# Patient Record
Sex: MALE | Race: WHITE | NOT HISPANIC OR LATINO
[De-identification: names, ages, dates, MRNs, and addresses within clinical notes are randomized per-mention and may not be internally consistent; named-entity substitution may affect disease eponyms.]

---

## 2017-12-12 ENCOUNTER — HOSPITAL (OUTPATIENT)
Dept: OTHER | Age: 27
End: 2017-12-12
Attending: INTERNAL MEDICINE

## 2023-01-07 ENCOUNTER — OFFICE VISIT (OUTPATIENT)
Dept: FAMILY MEDICINE CLINIC | Facility: CLINIC | Age: 33
End: 2023-01-07
Payer: COMMERCIAL

## 2023-01-07 VITALS
TEMPERATURE: 98 F | WEIGHT: 169.63 LBS | OXYGEN SATURATION: 98 % | HEART RATE: 81 BPM | DIASTOLIC BLOOD PRESSURE: 82 MMHG | HEIGHT: 75 IN | SYSTOLIC BLOOD PRESSURE: 136 MMHG | BODY MASS INDEX: 21.09 KG/M2 | RESPIRATION RATE: 20 BRPM

## 2023-01-07 DIAGNOSIS — J02.9 PHARYNGITIS, UNSPECIFIED ETIOLOGY: Primary | ICD-10-CM

## 2023-01-07 LAB
CONTROL LINE PRESENT WITH A CLEAR BACKGROUND (YES/NO): YES YES/NO
KIT LOT #: NORMAL NUMERIC
STREP GRP A CUL-SCR: NEGATIVE

## 2023-01-07 PROCEDURE — 3075F SYST BP GE 130 - 139MM HG: CPT | Performed by: NURSE PRACTITIONER

## 2023-01-07 PROCEDURE — 99202 OFFICE O/P NEW SF 15 MIN: CPT | Performed by: NURSE PRACTITIONER

## 2023-01-07 PROCEDURE — 3079F DIAST BP 80-89 MM HG: CPT | Performed by: NURSE PRACTITIONER

## 2023-01-07 PROCEDURE — 87880 STREP A ASSAY W/OPTIC: CPT | Performed by: NURSE PRACTITIONER

## 2023-01-07 PROCEDURE — 3008F BODY MASS INDEX DOCD: CPT | Performed by: NURSE PRACTITIONER

## 2024-06-12 ENCOUNTER — ANESTHESIA EVENT (OUTPATIENT)
Dept: SURGERY | Facility: HOSPITAL | Age: 34
End: 2024-06-12
Payer: COMMERCIAL

## 2024-06-12 ENCOUNTER — HOSPITAL ENCOUNTER (OUTPATIENT)
Facility: HOSPITAL | Age: 34
Setting detail: HOSPITAL OUTPATIENT SURGERY
Discharge: HOME | End: 2024-06-12
Attending: ORTHOPAEDIC SURGERY | Admitting: ORTHOPAEDIC SURGERY
Payer: COMMERCIAL

## 2024-06-12 ENCOUNTER — ANESTHESIA (OUTPATIENT)
Dept: SURGERY | Facility: HOSPITAL | Age: 34
End: 2024-06-12
Payer: COMMERCIAL

## 2024-06-12 VITALS
RESPIRATION RATE: 18 BRPM | HEIGHT: 75 IN | TEMPERATURE: 98 F | OXYGEN SATURATION: 98 % | HEART RATE: 79 BPM | SYSTOLIC BLOOD PRESSURE: 143 MMHG | DIASTOLIC BLOOD PRESSURE: 73 MMHG | WEIGHT: 164.69 LBS | BODY MASS INDEX: 20.48 KG/M2

## 2024-06-12 DIAGNOSIS — M93.262 OSTEOCHONDRITIS DISSECANS OF KNEE, LEFT: Primary | ICD-10-CM

## 2024-06-12 PROCEDURE — 76942 ECHO GUIDE FOR BIOPSY: CPT | Performed by: ANESTHESIOLOGY

## 2024-06-12 PROCEDURE — 0SUD0KZ SUPPLEMENT LEFT KNEE JOINT WITH NONAUTOLOGOUS TISSUE SUBSTITUTE, OPEN APPROACH: ICD-10-PCS | Performed by: ORTHOPAEDIC SURGERY

## 2024-06-12 PROCEDURE — 0SCD4ZZ EXTIRPATION OF MATTER FROM LEFT KNEE JOINT, PERCUTANEOUS ENDOSCOPIC APPROACH: ICD-10-PCS | Performed by: ORTHOPAEDIC SURGERY

## 2024-06-12 PROCEDURE — 0SBD4ZZ EXCISION OF LEFT KNEE JOINT, PERCUTANEOUS ENDOSCOPIC APPROACH: ICD-10-PCS | Performed by: ORTHOPAEDIC SURGERY

## 2024-06-12 DEVICE — IMPLANTABLE DEVICE: Type: IMPLANTABLE DEVICE | Site: KNEE | Status: FUNCTIONAL

## 2024-06-12 RX ORDER — MEPERIDINE HYDROCHLORIDE 25 MG/ML
INJECTION INTRAMUSCULAR; INTRAVENOUS; SUBCUTANEOUS
Status: COMPLETED
Start: 2024-06-12 | End: 2024-06-12

## 2024-06-12 RX ORDER — HYDROCODONE BITARTRATE AND ACETAMINOPHEN 5; 325 MG/1; MG/1
1 TABLET ORAL EVERY 6 HOURS PRN
Qty: 12 TABLET | Refills: 0 | Status: SHIPPED | OUTPATIENT
Start: 2024-06-12

## 2024-06-12 RX ORDER — NAPROXEN 500 MG/1
500 TABLET ORAL 2 TIMES DAILY
Qty: 60 TABLET | Refills: 1 | Status: SHIPPED | OUTPATIENT
Start: 2024-06-12

## 2024-06-12 RX ORDER — ACETAMINOPHEN 500 MG
1000 TABLET ORAL ONCE AS NEEDED
Status: COMPLETED | OUTPATIENT
Start: 2024-06-12 | End: 2024-06-12

## 2024-06-12 RX ORDER — ROCURONIUM BROMIDE 10 MG/ML
INJECTION, SOLUTION INTRAVENOUS AS NEEDED
Status: DISCONTINUED | OUTPATIENT
Start: 2024-06-12 | End: 2024-06-12 | Stop reason: SURG

## 2024-06-12 RX ORDER — CEFAZOLIN SODIUM 1 G/3ML
INJECTION, POWDER, FOR SOLUTION INTRAMUSCULAR; INTRAVENOUS AS NEEDED
Status: DISCONTINUED | OUTPATIENT
Start: 2024-06-12 | End: 2024-06-12 | Stop reason: SURG

## 2024-06-12 RX ORDER — HYDROMORPHONE HYDROCHLORIDE 1 MG/ML
0.2 INJECTION, SOLUTION INTRAMUSCULAR; INTRAVENOUS; SUBCUTANEOUS EVERY 5 MIN PRN
Status: DISCONTINUED | OUTPATIENT
Start: 2024-06-12 | End: 2024-06-12

## 2024-06-12 RX ORDER — HYDROCODONE BITARTRATE AND ACETAMINOPHEN 5; 325 MG/1; MG/1
1 TABLET ORAL ONCE AS NEEDED
Status: COMPLETED | OUTPATIENT
Start: 2024-06-12 | End: 2024-06-12

## 2024-06-12 RX ORDER — ASPIRIN 325 MG
325 TABLET, DELAYED RELEASE (ENTERIC COATED) ORAL DAILY
Qty: 21 TABLET | Refills: 0 | Status: SHIPPED | OUTPATIENT
Start: 2024-06-12 | End: 2024-07-03

## 2024-06-12 RX ORDER — NEOSTIGMINE METHYLSULFATE 1 MG/ML
INJECTION, SOLUTION INTRAVENOUS AS NEEDED
Status: DISCONTINUED | OUTPATIENT
Start: 2024-06-12 | End: 2024-06-12 | Stop reason: SURG

## 2024-06-12 RX ORDER — GLYCOPYRROLATE 0.2 MG/ML
INJECTION, SOLUTION INTRAMUSCULAR; INTRAVENOUS AS NEEDED
Status: DISCONTINUED | OUTPATIENT
Start: 2024-06-12 | End: 2024-06-12 | Stop reason: SURG

## 2024-06-12 RX ORDER — ACETAMINOPHEN 500 MG
1000 TABLET ORAL ONCE
Status: DISCONTINUED | OUTPATIENT
Start: 2024-06-12 | End: 2024-06-12 | Stop reason: HOSPADM

## 2024-06-12 RX ORDER — HYDROMORPHONE HYDROCHLORIDE 1 MG/ML
INJECTION, SOLUTION INTRAMUSCULAR; INTRAVENOUS; SUBCUTANEOUS
Status: COMPLETED
Start: 2024-06-12 | End: 2024-06-12

## 2024-06-12 RX ORDER — TRANEXAMIC ACID 10 MG/ML
INJECTION, SOLUTION INTRAVENOUS AS NEEDED
Status: DISCONTINUED | OUTPATIENT
Start: 2024-06-12 | End: 2024-06-12 | Stop reason: SURG

## 2024-06-12 RX ORDER — ONDANSETRON 4 MG/1
4 TABLET, FILM COATED ORAL EVERY 8 HOURS PRN
Qty: 10 TABLET | Refills: 0 | Status: SHIPPED | OUTPATIENT
Start: 2024-06-12

## 2024-06-12 RX ORDER — IBUPROFEN 600 MG/1
600 TABLET ORAL ONCE AS NEEDED
Status: DISCONTINUED | OUTPATIENT
Start: 2024-06-12 | End: 2024-06-12

## 2024-06-12 RX ORDER — MEPERIDINE HYDROCHLORIDE 25 MG/ML
12.5 INJECTION INTRAMUSCULAR; INTRAVENOUS; SUBCUTANEOUS AS NEEDED
Status: DISCONTINUED | OUTPATIENT
Start: 2024-06-12 | End: 2024-06-12

## 2024-06-12 RX ORDER — ONDANSETRON 2 MG/ML
4 INJECTION INTRAMUSCULAR; INTRAVENOUS EVERY 6 HOURS PRN
Status: DISCONTINUED | OUTPATIENT
Start: 2024-06-12 | End: 2024-06-12

## 2024-06-12 RX ORDER — LIDOCAINE HYDROCHLORIDE 10 MG/ML
INJECTION, SOLUTION EPIDURAL; INFILTRATION; INTRACAUDAL; PERINEURAL AS NEEDED
Status: DISCONTINUED | OUTPATIENT
Start: 2024-06-12 | End: 2024-06-12 | Stop reason: SURG

## 2024-06-12 RX ORDER — SODIUM CHLORIDE, SODIUM LACTATE, POTASSIUM CHLORIDE, CALCIUM CHLORIDE 600; 310; 30; 20 MG/100ML; MG/100ML; MG/100ML; MG/100ML
INJECTION, SOLUTION INTRAVENOUS CONTINUOUS
Status: DISCONTINUED | OUTPATIENT
Start: 2024-06-12 | End: 2024-06-12

## 2024-06-12 RX ORDER — HYDROMORPHONE HYDROCHLORIDE 1 MG/ML
0.6 INJECTION, SOLUTION INTRAMUSCULAR; INTRAVENOUS; SUBCUTANEOUS EVERY 5 MIN PRN
Status: DISCONTINUED | OUTPATIENT
Start: 2024-06-12 | End: 2024-06-12

## 2024-06-12 RX ORDER — LABETALOL HYDROCHLORIDE 5 MG/ML
5 INJECTION, SOLUTION INTRAVENOUS EVERY 5 MIN PRN
Status: DISCONTINUED | OUTPATIENT
Start: 2024-06-12 | End: 2024-06-12

## 2024-06-12 RX ORDER — HYDROCODONE BITARTRATE AND ACETAMINOPHEN 5; 325 MG/1; MG/1
2 TABLET ORAL ONCE AS NEEDED
Status: COMPLETED | OUTPATIENT
Start: 2024-06-12 | End: 2024-06-12

## 2024-06-12 RX ORDER — NALOXONE HYDROCHLORIDE 0.4 MG/ML
0.08 INJECTION, SOLUTION INTRAMUSCULAR; INTRAVENOUS; SUBCUTANEOUS AS NEEDED
Status: DISCONTINUED | OUTPATIENT
Start: 2024-06-12 | End: 2024-06-12

## 2024-06-12 RX ORDER — MIDAZOLAM HYDROCHLORIDE 1 MG/ML
INJECTION INTRAMUSCULAR; INTRAVENOUS AS NEEDED
Status: DISCONTINUED | OUTPATIENT
Start: 2024-06-12 | End: 2024-06-12 | Stop reason: SURG

## 2024-06-12 RX ORDER — DEXAMETHASONE SODIUM PHOSPHATE 4 MG/ML
VIAL (ML) INJECTION AS NEEDED
Status: DISCONTINUED | OUTPATIENT
Start: 2024-06-12 | End: 2024-06-12 | Stop reason: SURG

## 2024-06-12 RX ORDER — SCOLOPAMINE TRANSDERMAL SYSTEM 1 MG/1
1 PATCH, EXTENDED RELEASE TRANSDERMAL ONCE
Status: DISCONTINUED | OUTPATIENT
Start: 2024-06-12 | End: 2024-06-12 | Stop reason: HOSPADM

## 2024-06-12 RX ORDER — MIDAZOLAM HYDROCHLORIDE 1 MG/ML
1 INJECTION INTRAMUSCULAR; INTRAVENOUS EVERY 5 MIN PRN
Status: DISCONTINUED | OUTPATIENT
Start: 2024-06-12 | End: 2024-06-12

## 2024-06-12 RX ORDER — ONDANSETRON 2 MG/ML
INJECTION INTRAMUSCULAR; INTRAVENOUS AS NEEDED
Status: DISCONTINUED | OUTPATIENT
Start: 2024-06-12 | End: 2024-06-12 | Stop reason: SURG

## 2024-06-12 RX ORDER — HYDROMORPHONE HYDROCHLORIDE 1 MG/ML
0.4 INJECTION, SOLUTION INTRAMUSCULAR; INTRAVENOUS; SUBCUTANEOUS EVERY 5 MIN PRN
Status: DISCONTINUED | OUTPATIENT
Start: 2024-06-12 | End: 2024-06-12

## 2024-06-12 RX ORDER — ROPIVACAINE HYDROCHLORIDE 5 MG/ML
INJECTION, SOLUTION EPIDURAL; INFILTRATION; PERINEURAL AS NEEDED
Status: DISCONTINUED | OUTPATIENT
Start: 2024-06-12 | End: 2024-06-12 | Stop reason: SURG

## 2024-06-12 RX ORDER — PROCHLORPERAZINE EDISYLATE 5 MG/ML
5 INJECTION INTRAMUSCULAR; INTRAVENOUS EVERY 8 HOURS PRN
Status: DISCONTINUED | OUTPATIENT
Start: 2024-06-12 | End: 2024-06-12

## 2024-06-12 RX ORDER — DEXAMETHASONE SODIUM PHOSPHATE 10 MG/ML
INJECTION, SOLUTION INTRAMUSCULAR; INTRAVENOUS AS NEEDED
Status: DISCONTINUED | OUTPATIENT
Start: 2024-06-12 | End: 2024-06-12 | Stop reason: SURG

## 2024-06-12 RX ADMIN — SODIUM CHLORIDE, SODIUM LACTATE, POTASSIUM CHLORIDE, CALCIUM CHLORIDE: 600; 310; 30; 20 INJECTION, SOLUTION INTRAVENOUS at 14:52:00

## 2024-06-12 RX ADMIN — MIDAZOLAM HYDROCHLORIDE 2 MG: 1 INJECTION INTRAMUSCULAR; INTRAVENOUS at 14:44:00

## 2024-06-12 RX ADMIN — GLYCOPYRROLATE 0.8 MG: 0.2 INJECTION, SOLUTION INTRAMUSCULAR; INTRAVENOUS at 16:59:00

## 2024-06-12 RX ADMIN — DEXAMETHASONE SODIUM PHOSPHATE 2 MG: 10 INJECTION, SOLUTION INTRAMUSCULAR; INTRAVENOUS at 14:52:00

## 2024-06-12 RX ADMIN — SODIUM CHLORIDE, SODIUM LACTATE, POTASSIUM CHLORIDE, CALCIUM CHLORIDE: 600; 310; 30; 20 INJECTION, SOLUTION INTRAVENOUS at 17:38:00

## 2024-06-12 RX ADMIN — ROCURONIUM BROMIDE 50 MG: 10 INJECTION, SOLUTION INTRAVENOUS at 14:55:00

## 2024-06-12 RX ADMIN — LIDOCAINE HYDROCHLORIDE 100 MG: 10 INJECTION, SOLUTION EPIDURAL; INFILTRATION; INTRACAUDAL; PERINEURAL at 14:54:00

## 2024-06-12 RX ADMIN — ONDANSETRON 4 MG: 2 INJECTION INTRAMUSCULAR; INTRAVENOUS at 16:57:00

## 2024-06-12 RX ADMIN — DEXAMETHASONE SODIUM PHOSPHATE 4 MG: 4 MG/ML VIAL (ML) INJECTION at 14:59:00

## 2024-06-12 RX ADMIN — ROPIVACAINE HYDROCHLORIDE 20 ML: 5 INJECTION, SOLUTION EPIDURAL; INFILTRATION; PERINEURAL at 14:52:00

## 2024-06-12 RX ADMIN — NEOSTIGMINE METHYLSULFATE 5 MG: 1 INJECTION, SOLUTION INTRAVENOUS at 16:59:00

## 2024-06-12 RX ADMIN — TRANEXAMIC ACID 1000 MG: 10 INJECTION, SOLUTION INTRAVENOUS at 15:00:00

## 2024-06-12 RX ADMIN — CEFAZOLIN SODIUM 2 G: 1 INJECTION, POWDER, FOR SOLUTION INTRAMUSCULAR; INTRAVENOUS at 14:52:00

## 2024-06-12 NOTE — H&P
ORTHOPEDIC SURGERY CLINIC PROGRESS NOTE      Patient Name:Butch Toro Jr.  Date of Appointment: 6/12/2024     Chief Complaint: Patient presents with:  Left Knee - Pain        HPI:    Butch Toro Jr. is a 33 year old male who presents for evaluation of left knee pain that has been present intermittently for  several  years but acutely worsened over the past 3 weeks with further worsening yesterday. Patient states that the pain began as the result of a direct blow the the anterior knee related mechanism. Since onset, symptoms have worsened. He notes pain with weightbearing, associated intermittent swelling, and attempted knee ROM . He endorses mechanical symptoms including clicking, locking, and catching. He endorses acute onset large effusion following injury yesterday. He denies feelings or rotational or coronal plane instability and feelings of giving way. He has attempted treatment with oral OTC antiinflammatory medication and tylenol. He has had no prior advanced imaging of the affected knee.        Past Medical History  History reviewed. No pertinent past medical history.     Past Surgical History        Past Surgical History:   Procedure Laterality Date    OTHER SURGICAL HISTORY   8/3/16     Scrotal cyst removal  Dr. Marshall     OTHER SURGICAL HISTORY   12/14/16     removal of scrotal cyst Dr Marshall         Medications  No current outpatient medications on file.     Allergies     Sulfa Antibiotics       OTHER (SEE COMMENTS)    Comment:Unknown reaction     Social History  Social History    Tobacco Use      Smoking status: Never      Smokeless tobacco: Never    Vaping Use      Vaping Use: Never used    Alcohol use: Yes      Comment: 3 days per week, 2-5 drinks per day    Drug use: No        Family History:        Family History   Problem Relation Age of Onset    Cancer Paternal Grandfather           prostate         Review of Systems:   Constitutional:  Denies fever, chills or weight loss    Allergies: As described in allergies  HEENT:  Denies sore throat or acute eye problems   Respiratory:  Denies shortness of breath   Cardiovascular:  Denies chest pain or palpitations  GI:  Denies abdominal pain, nausea  Skin:  Denies rash   Neurologic:  Denies headache or other problems  Musculoskeletal:  As described in HPI  14 System Review of Systems otherwise negative      Physical Exam:      There were no vitals taken for this visit.      Constitutional: No acute distress. Well-nourished. Well-developed.  Head/Face: Normal appearance. Normocephalic. Atraumatic.  Respiratory: Normal Effort  Cardiovascular: warm, well-perfused distal extremities  Neurological: Oriented to time, place, and situation.  Psych: Normal mood, appropriate affect.     Musculoskeletal:        Gait: Normal. The patient can bear weight on the injured extremity.   Neurovascular exam: Sensation is intact to light touch in all dermatomes of the lower extremities and distal pulses are normal. Capillary refill is < 2 seconds.  Lumbar Spine: No tenderness to palpation and supine straight leg raise is negative.     KNEE:    5/10/2024    5/10/2024        RIGHT Knee LEFT Knee   Deformity No gross deformity No gross deformity   Skin Clean, dry, and intact. No erythema or ecchymosis Clean, dry, and intact. No erythema or ecchymosis   Atrophy None None   Tenderness None Medial femoral condyle in deep flexion   Effusion Negative Negative   Range of Motion    Extension: 0  Flexion: 140 Extension: 0  Flexion: 140   Strength Normal and equal bilaterally Normal and equal bilaterally   Stability Lachman: Stable  LCL: Stable  MCL: Stable  PCL: Stable Lachman: Stable  LCL: Stable  MCL: Stable  PCL: Stable   Special Tests None Juanpablo: positive medial   Hip motion No pain on internal and external rotation No pain on internal and external rotation   Sensation Intact except as noted    Intact except as noted   Perfusion Distal pulse present   Distal pulse  present   Lymphatics No lymphadenopathy and No edema    No lymphadenopathy and No edema            Diagnostic Studies:      4 views of the left knee including bilateral AP standing, 45 degree flexion, sunrise, and affected lateral demonstrate no acute fracture or dislocation.  There is well-preserved joint space in medial, lateral, patellofemoral compartments without evidence of degenerative change. There is appropriate patellar positioning on lateral imaging without evidence of nicolette or baja.  There is a radiolucent area present to the medial femoral condyle concerning for possible old osteochondritis dissecans with associated intra-articular loose body visualized on APs and lateral views present within the notch.    MRI demonstrates a 2.5 cm x 1.6 cm osteochondral defect along the weightbearing aspect of the medial femoral condyle. This demonstrates a large dislodged intra-articular osteochondral fragment that is present in 2 pieces both within the anterior notch. Remaining aspects of the knee joint appropriate appearance without evidence of injury.      Assessment:      33 year old male presents with the following diagnoses:     1. Acute pain of left knee  - XR KNEE, COMPLETE (4 OR MORE VIEWS), LEFT (CPT=73564); Future  - MRI KNEE, LEFT (QUT=21679); Future        Plan:      Plan:   - To OR for left knee arthroscopy, removal of loose body, open osteochondral allograft reconstruction of medial femoral condyle lesion       Martín Batista MD  [facility]  Orthopedic Surgery, Sports Medicine

## 2024-06-12 NOTE — ANESTHESIA POSTPROCEDURE EVALUATION
[facility]    Butch Toro  Patient Status:  Hospital Outpatient Surgery   Age/Gender 33 year old male MRN [id number]   Location [facility] SURGERY Attending Martín Batista MD   Hosp Day # 0 PCP No primary care provider on file.       Anesthesia Post-op Note    LEFT KNEE ARTHROSCOPY, CHONDROPLASTY, REMOVAL OF LOOSE BODY, OPEN OSTEOCHONDRAL ALLOGRAFT RECONSTRUCTION    Procedure Summary       Date: 06/12/24 Room / Location:  MAIN OR [address] [place] [address] MAIN OR    Anesthesia Start: 1442 Anesthesia Stop: 1738    Procedure: LEFT KNEE ARTHROSCOPY, CHONDROPLASTY, REMOVAL OF LOOSE BODY, OPEN OSTEOCHONDRAL ALLOGRAFT RECONSTRUCTION (Left: Knee) Diagnosis: (OSTEOCHONDRITIS DISSECANS, CHONDRAL LOOSE BODY)    Surgeons: Martín Batista MD Anesthesiologist: Darvin Arvizu MD    Anesthesia Type: general, regional ASA Status: 1            Anesthesia Type: general, regional    Vitals Value Taken Time   /70 06/12/24 1739   Temp 98.2 06/12/24 1740   Pulse 102 06/12/24 1740   Resp 18 06/12/24 1740   SpO2 100 % 06/12/24 1740   Vitals shown include unfiled device data.    Patient Location: PACU    Anesthesia Type: general    Airway Patency: patent and extubated    Postop Pain Control: adequate    Mental Status: mildly sedated but able to meaningfully participate in the post-anesthesia evaluation    Nausea/Vomiting: none    Cardiopulmonary/Hydration status: stable euvolemic    Complications: no apparent anesthesia related complications    Postop vital signs: stable    Dental Exam: Unchanged from Preop    Patient to be discharged from PACU when criteria met.

## 2024-06-12 NOTE — ANESTHESIA PROCEDURE NOTES
Airway  Date/Time: 6/12/2024 2:57 PM  Urgency: elective    Airway not difficult    General Information and Staff    Patient location during procedure: OR  Anesthesiologist: Rod Dasilva MD  Resident/CRNA: Keiko Lindsey CRNA  Performed: CRNA   Performed by: Keiko Lindsey CRNA  Authorized by: Rod Dasilva MD      Indications and Patient Condition  Indications for airway management: anesthesia  Sedation level: deep  Preoxygenated: yes  Patient position: sniffing  Mask difficulty assessment: 1 - vent by mask    Final Airway Details  Final airway type: endotracheal airway      Successful airway: ETT  Cuffed: yes   Successful intubation technique: direct laryngoscopy  Endotracheal tube insertion site: oral  Blade: Azeb  Blade size: #4  ETT size (mm): 8.0    Cormack-Lehane Classification: grade I - full view of glottis  Placement verified by: capnometry   Measured from: lips  ETT to lips (cm): 23  Number of attempts at approach: 1    Additional Comments  Atraumatic intubation. Dentition and OP as per preop.

## 2024-06-12 NOTE — BRIEF OP NOTE
Pre-Operative Diagnosis: OSTEOCHONDRITIS DISSECANS, CHONDRAL LOOSE BODY     Post-Operative Diagnosis: OSTEOCHONDRITIS DISSECANS, CHONDRAL LOOSE BODY      Procedure Performed:   LEFT KNEE ARTHROSCOPY, CHONDROPLASTY, REMOVAL OF LOOSE BODY, OPEN OSTEOCHONDRAL ALLOGRAFT RECONSTRUCTION     Surgeons and Role:     * Martín Batista MD - Primary     * Johnny Meredith MD - Assisting Surgeon    Assistant(s):  PA: Lenard Rock PA-C     Skilled assistance was needed for patient positioning, prepping and draping, instrument holding and passing, retracting, and suturing.    Surgical Findings: See detailed operative report     Specimen: None     Estimated Blood Loss: Blood Output: 150 mL (6/12/2024  5:07 PM)    Martín Batista MD  6/12/2024  5:18 PM

## 2024-06-12 NOTE — ANESTHESIA PROCEDURE NOTES
Regional Block    Date/Time: 6/12/2024 2:50 PM    Performed by: Rod Dasilva MD  Authorized by: Rod Dasilva MD      General Information and Staff    Start Time:  6/12/2024 2:50 PM  End Time:  6/12/2024 2:52 PM  Anesthesiologist:  Rod Dasilva MD  Performed by:  Anesthesiologist  Patient Location:  OR    Block Placement: Pre Induction  Site Identification: real time ultrasound guided and image stored and retrievable    Block site/laterality marked before start: site marked  Reason for Block: at surgeon's request and post-op pain management    Preanesthetic Checklist: 2 patient identifers, IV checked, risks and benefits discussed, monitors and equipment checked, pre-op evaluation, timeout performed, anesthesia consent, sterile technique used, no prohibitive neurological deficits and no local skin infection at insertion site      Procedure Details    Patient Position:  Supine  Prep: ChloraPrep    Monitoring:  Cardiac monitor, continuous pulse ox and blood pressure cuff  Block Type:  Adductor canal  Laterality:  Left  Injection Technique:  Single-shot    Needle    Needle Type:  Short-bevel and echogenic  Needle Localization:  Ultrasound guidance  Reason for Ultrasound Use: appropriate spread of the medication was noted in real time and no ultrasound evidence of intravascular and/or intraneural injection            Assessment    Injection Assessment:  Good spread noted, negative resistance, negative aspiration for heme, incremental injection and low pressure  Heart Rate Change: No    - Patient tolerated block procedure well without evidence of immediate block related complications.     Medications  6/12/2024 2:50 PM      Additional Comments    Medication:  Ropivacaine 0.5% 20mL and dexamethasone 2 mg PF.

## 2024-06-12 NOTE — DISCHARGE INSTRUCTIONS
Martín Batista MD  [facility]  Orthopaedic Surgery - Sports Medicine    Post Operative Instructions: Knee Surgery - Cartilage Restoration (Osteochondral Allograft Reconstruction)    WEIGHT BEARING / MOVEMENT  You are NOT to bend your knee past 90 degrees for 6 weeks following surgery. You will be placed in a hinged knee brace that helps limit your motion for these 6 weeks. Wear this brace at all time other than bathing, including while you sleep.    You are NOT to put weight on your operative knee for 6 weeks following surgery (Non-weightbearing). You will be given crutched at the time of surgery to use to keep weight off of your injured knee. This period is important to given the repaired cartilage adequate time to heal. Do not go for long walks or stand with your crutches for extended periods of time, as these activities will cause swelling and pain in your knee.    ICE MACHINE  You should use the provided ice machine over the surgical site regularly throughout the day to help decrease swelling and pain. Make sure to have a layer between the ice and your knee so as to not injury your skin. In addition to icing your knee, elevate your knee so that your toes are above your nose, as this will help significantly to reduce swelling.    MEDICATIONS  Local anesthetic will be injected into your knee after the completion of the surgery. This medication will wear off in 5 to 6 hours. To control your pain during this transition while the anesthetic is wearing off, you should start the use of your pain medication, Norco, as you feel is needed.    Take Aspirin 325mg once daily starting the day after surgery for 3 weeks to help prevent the formation of blood clots.    An anti-nausea medication, Zofran, was prescribed for you and should be taken on an as needed basis, as the pain medication can cause nausea. To minimize this side effect, you should take your pain medication with some food.    DRESSING / BANDAGES:  Keep  your surgical dressing clean and dry. You may remove your dressing two days after surgery. There is a layer of skin glue and butterfly strips covering the incision. At this time (with the incisions covered) you make take a shower, however you should avoid direct contact on the incision sites. Do not scrub the incision area or apply any soaps or lotions. Please keep the incision sites clean and dry.    Do not take a bath or submerge your knee in water until your incisions are checked by me at your post-operative appointment and fully healed with all stitches removed. This is typically 3 weeks after surgery.    TEMPERATURE  It is normal to have an elevated temperature during the first 2-3 days post-operatively. Please call our office if your temperature is above 101F, if there is increased redness around the incision sites, or if there is increased drainage from the incision sites.    APPOINTMENT  It is likely that my surgical scheduler, Heidi, has already scheduled a post-operative visit for you. This should occur 2 weeks after surgery.

## 2024-06-12 NOTE — ANESTHESIA PREPROCEDURE EVALUATION
PRE-OP EVALUATION    Patient Name: Butch Toro Jr.    Admit Diagnosis: OSTEOCHONDRITIS DISSECANS, CHONDRAL LOOSE BODY    Pre-op Diagnosis: OSTEOCHONDRITIS DISSECANS, CHONDRAL LOOSE BODY    LEFT KNEE ARTHROSCOPY, CHONDROPLASTY, REMOVAL OF LOOSE BODY, OPEN OSTEOCHONDRAL ALLOGRAFT RECONSTRUCTION    Anesthesia Procedure: LEFT KNEE ARTHROSCOPY, CHONDROPLASTY, REMOVAL OF LOOSE BODY, OPEN OSTEOCHONDRAL ALLOGRAFT RECONSTRUCTION (Left)    Surgeons and Role:     * Martín Batista MD - Primary    Pre-op vitals reviewed.  Temp: 98.9 °F (37.2 °C)  Pulse: 88  Resp: 14  BP: 136/83  SpO2: 100 %  Body mass index is 20.58 kg/m².    Current medications reviewed.  Hospital Medications:   acetaminophen (Tylenol Extra Strength) tab 1,000 mg  1,000 mg Oral Once    scopolamine (Transderm-Scop) 1 MG/3DAYS patch 1 patch  1 patch Transdermal Once    lactated ringers infusion   Intravenous Continuous    ceFAZolin (Ancef) 2g in 10mL IV syringe premix  2 g Intravenous Once       Outpatient Medications:     No medications prior to admission.       Allergies: Sulfa antibiotics      Anesthesia Evaluation    Patient summary reviewed.    Anesthetic Complications  (-) history of anesthetic complications         GI/Hepatic/Renal    Negative GI/hepatic/renal ROS.                             Cardiovascular    Negative cardiovascular ROS.    Exercise tolerance: good     MET: >4                                           Endo/Other    Negative endo/other ROS.                              Pulmonary    Negative pulmonary ROS.                       Neuro/Psych    Negative neuro/psych ROS.                                  Past Surgical History:   Procedure Laterality Date    Other surgical history  8/3/16    Scrotal cyst removal  Dr. Marshall     Other surgical history  12/14/16    removal of scrotal cyst Dr Marshall     Social History     Socioeconomic History    Marital status: Single   Tobacco Use    Smoking status: Never    Smokeless tobacco: Never    Vaping Use    Vaping status: Never Used   Substance and Sexual Activity    Alcohol use: Not Currently    Drug use: No     History   Drug Use No     Available pre-op labs reviewed.               Airway      Mallampati: I  Mouth opening: >3 FB  TM distance: 4 - 6 cm  Neck ROM: full Cardiovascular    Cardiovascular exam normal.         Dental  Comment: Dentition without gross abnormality, chips, or decay. Permanent retainer noted on lower teeth- stable.  Dentition appears grossly intact         Pulmonary    Pulmonary exam normal.                 Other findings              ASA: 1   Plan: general and regional  NPO status verified and Patient does not meet NPO guidelines.  Patient has not taken beta blockers in last 24 hours.    Surgeon requests: regional block  Comment: Possible adductor canal blockade r/b/a d/w patient.  Plan/risks discussed with: patient                Present on Admission:  **None**

## 2024-06-12 NOTE — OPERATIVE REPORT
Pre-Operative Diagnosis: OSTEOCHONDRITIS DISSECANS, CHONDRAL LOOSE BODY     Post-Operative Diagnosis: OSTEOCHONDRITIS DISSECANS, CHONDRAL LOOSE BODY      Procedure Performed:   LEFT KNEE ARTHROSCOPY, CHONDROPLASTY, REMOVAL OF LOOSE BODY, OPEN OSTEOCHONDRAL ALLOGRAFT RECONSTRUCTION      Surgeons and Role:     * Martín Batista MD - Primary     * Johnny Meredith MD - Assisting Surgeon     Assistant(s):  PA: Lenard oRck PA-C     Skilled assistance was needed for patient positioning, prepping and draping, instrument holding and passing, retracting, and suturing.     Surgical Findings: See detailed operative report     Specimen: None     Estimated Blood Loss: Blood Output: 150 mL (6/12/2024  5:07 PM)    Operative Indication:  The patient is a 33 year-old male with prior history of left knee pain and symptoms concerning for medial femoral condyle OCD lesion.  He had radiograph and MRI demonstrating the lesion. He had recurrence of locking and effusion type symptoms initially following his injury. Operative and nonoperative treatments were discussed in detail.  We discussed that given the lesion size as well as the etiology being as a result of prior osteochondritis dissecans with compromise of the underlying subchondral bone that the most appropriate restoration option for her was an osteochondral allograft reconstruction. Risks were discussed including but not limited to bleeding, infection, DVT, anesthesia complications, damage to nerves and blood vessels, continued pain, fragmentation of the cartilaginous implantation, incomplete healing of the bony undersurface, cartilage cell layer or degeneration, degenerative changes noted to other aspects of the lateral compartment.  He understands these risks and wishes to proceed.     The patient was met in the preoperative holding area and the correct side was identified and marked.  He was taken to the operating room and placed under general anesthesia.   The  extremity was prepped and draped in usual sterile fashion. A pre-operative time out was performed confirming the correct patient, correct operative site and side, correct procedure, and that pre-operative antibiotics had been administered.      The case began with a diagnostic knee arthroscopy.  The patellofemoral compartment was entered first.  The patellar surface was normal in appearance without notable chondral injury, and the trochlea was normal in appearance without notable chondral injury.  Both the medial and lateral gutters were inspected and were free of any loose bodies.  A medial portal was established under direct visualization.  The medial compartment was entered.  The chondral surface of the medial femoral condyle demonstrated an approximately 25 mm x 14 mm lesion of the weightbearing medial femoral condyle with unstable peripheral flaps, underlying exposed subchondral bone with large displaced osteochondral loose body within the lesion bed. The loose body was removed in its entirety in 1 piece. 2nd loose body from suprapatellar pouch also removed in its entirety in 1 piece. With the use of shaver the peripheral edges of the unstable cartilage flap were debrided back to stable peripheral base confirming the size and extent of the cartilage injury. The chondral surface of the medial tibial plateau was normal in appearance without notable chondral injury.  The medial meniscus was inspected in its entirety including the root and undersurface. The meniscus was examined in its entirety and noted to be intact and stable. The posteromedial and medial recesses were interrogated and noted to be without evidence of entrapped loose body or flipped meniscal fragment.     The notch was inspected next.  The ACL and PCL were normal in appearance.  The lateral compartment was entered.  The chondral surfaces of the lateral tibial plateau was normal in appearance without notable chondral injury.  The chondral surface  of the lateral femoral condyle was normal in appearance without notable chondral injury.  The lateral meniscus was inspected in its entirety including the root and undersurface. The meniscus was examined in its entirety and noted to be intact and stable. The posteriolateral and lateral recesses were interrogated and noted to be without evidence of entrapped loose body or flipped meniscal fragment.     At this point the arthroscopic portion of the case was terminated.  The knee was irrigated and flushed repeated with presence of any persistent debris or loose bodies.     Attention was then turned to the open portion of the procedure.  A medial parapatellar arthrotomy was utilized by extending the prior medial arthroscopic portal superiorly approximately 8 cm in total.  Sharp dissection and electrocautery were used to dissect down to the capsular layer and then a medial parapatellar arthrotomy was completed.  A small excision of the retropatellar fat pad was completed in the operative area to appropriately visualize the lesion to the medial femoral condyle.  The lesion was adequately accessible in 100 degrees of knee flexion.  With the use of a Z retractor within the notch and an [employer] laterally the base of the lesion was debrided.  With the use of the [employer] osteochondral allograft reconstruction kit, a 25 mm circular guide was selected and noted to adequately fit the entire zone of the lesion bed.  This was demarcated with a marking pen and noted to adequately cover the affected area.  Through the central aspect of the 25 mm circular guide, a drill pin was then placed perpendicular to the lesion surface and advanced to a depth of 2 cm.     The cannulated cartilaginous primary guide was then used and inserted to demarcate the edges of the zone of debridement.  The 25 mm core reamer was then placed over the cannulated K wire and slowly advanced and pulsatile bursts of no more than 5 seconds under constant normal  saline irrigation to act as a heat sink and limit any risk of thermal necrosis to surrounding bony and cartilaginous tissue to an appropriate depth.  Care was taken so as to not plunge within the lateral femoral condyle.  Once the lesion was prepared to an appropriate depth the area was irrigated copiously and measurements were taken at the 12:00, 3:00, 6:00, and 9:00 positions to adequately measure the primary depth of the corresponding prepared bed for allograft preparation.  A 0.062 K-wire was then used to perforate the base of the prepared zone to allow for bone marrow element infiltration of the future zone of healing.     The osteochondral allograft medial Yordy condyle that had previously been thawed following diagnostic arthroscopy was then removed from its aseptic bag and bathing solution was then placed into a sterile bowl.  The corresponding 25 mm circular guide was then used to select the appropriate topography within the Yordy condyle for donor graft preparation.  Once this was appropriately selected and demarcated the epicondyle was held in place with the [employer] osteochondral allograft graft preparation station and a trephine coring reamer was then used to prepare a 25 mm donor plug with a demarcated 12 o'clock position.  The previously demarcated measurements at the 12:00, 3:00, 6:00, and 9:00 positions were then transferred to the corresponding depths and positions.  The graft was then cut with the use of oscillating saw onto the appropriate measurements and is slowly prepared to the appropriate depth.  Once the appropriate size of the graft had been confirmed it was then washed copiously with the use of pulsatile lavage to remove any remaining marrow elements from within the allograft.     The prepared osteochondral allograft plug was then transferred from the back table to the operative zone and placed in appropriate orientation into its recipient bed.  Placement into the recipient bed was initially  done with manual thumb palpation and then slowly placed into the appropriate depth with light strikes from a secondary impactor.  The allograft was noted to have excellent terminal positioning with no significant area of prominence and only a slightly recessed cartilaginous surface in the non-weightbearing aspect of the condyle that progressed into the notch wall.  There was smooth contour and appropriate smooth tracking with knee flexion and extension throughout the entirety of the knee range of motion.  There was no concern for any instability of the reconstructed graft and as such no chondral darts were required for supplemental fixation.  The knee was copiously irrigated.     Attention was then turned to closure.  The medial parapatellar arthrotomy was closed with interrupted figure-of-eight 0 Vicryl sutures.  The superficial layer was then closed in layers with 2-0 Vicryl and a running buried 4-0 Monocryl.  A sterile dressing comprised of Dermabond and Steri-Strips were then placed, and the leg was placed into a hinged knee brace locked in extension with allowance of range of motion from 0 to 90 degrees.  He will be nonweightbearing on the extremity for 6 weeks and restricted to 90 degrees range of motion during this interval.  The patient was awakened from anesthesia without any immediately apparent complications and transferred to the postoperative recovery area in stable condition.     Martín Batista MD  6/12/2024

## (undated) DEVICE — BANDAGE COHESIVE 4INX5YD TAN E POR SLF ADH

## (undated) DEVICE — GOWN,SIRUS,FABRIC-REINFORCED,X-LARGE: Brand: MEDLINE

## (undated) DEVICE — COVER LT HNDL RIG FOR SUR CAM DISP

## (undated) DEVICE — SUT VCRL 0 27IN CP-2 ABSRB UD L26MM 1/2 CIR

## (undated) DEVICE — BANDAGE,GAUZE,BULKEE II,4.5"X4.1YD,STRL: Brand: MEDLINE

## (undated) DEVICE — [AGGRESSIVE PLUS CUTTER, ARTHROSCOPIC SHAVER BLADE,  DO NOT RESTERILIZE,  DO NOT USE IF PACKAGE IS DAMAGED,  KEEP DRY,  KEEP AWAY FROM SUNLIGHT]: Brand: FORMULA

## (undated) DEVICE — Device

## (undated) DEVICE — SUT FIBERLOOP 2 20IN N ABSRB BLU L76MM

## (undated) DEVICE — PENCIL SMK EVAC L10FT MPLR BLDE JAW OPN

## (undated) DEVICE — DISPOSABLE TOURNIQUET CUFF SINGLE BLADDER, DUAL PORT AND QUICK CONNECT CONNECTOR: Brand: COLOR CUFF

## (undated) DEVICE — ACL ADD ON PACK-LF: Brand: MEDLINE INDUSTRIES, INC.

## (undated) DEVICE — SUT TIGERLOOP 2 20IN N ABSRB WHT GRN

## (undated) DEVICE — OR TOWELS NON-STERILE, 100 BULK, BLUE, 400/CS: Brand: PREMIERPRO

## (undated) DEVICE — SUT PDS II 0 36IN CT-1 ABSRB VLT L36MM 1/2

## (undated) DEVICE — #15 STERILE STAINLESS BLADE: Brand: STERILE STAINLESS BLADES

## (undated) DEVICE — TUBING PMP L16FT DISP

## (undated) DEVICE — DUAL SPIKE ADAPTER: Brand: CONMED

## (undated) DEVICE — DEVICE SUCT FLR W/ TBNG WATERBUG QUIET 30 PER

## (undated) DEVICE — APPLICATOR PREP 26ML CHG 2% ISO ALC 70%

## (undated) DEVICE — SUT VCRL 2-0 27IN FSL ABSRB UD L30MM 3/8 CIR

## (undated) DEVICE — PADDING CAST 4INX4YD 100% COT SFT SLF BOND

## (undated) DEVICE — KNEE ARTHROSCOPY CDS: Brand: MEDLINE INDUSTRIES, INC.

## (undated) DEVICE — SLEEVE COMPR MD KNEE LEN SGL USE KENDALL SCD

## (undated) DEVICE — SUT FBRWR 2 38IN N ABSRB BLU L36.6MM 1/2

## (undated) DEVICE — SYRINGE BULB 50/CS 48/PLT: Brand: MEDEGEN MEDICAL PRODUCTS, LLC

## (undated) DEVICE — GLOVE SUR 8 SENSICARE PI PIP CRM PWD F

## (undated) DEVICE — SUT FBRWR 2 38IN N ABSRB BLU L26.5MM 1/2

## (undated) DEVICE — ADHESIVE SKIN TOP FOR WND CLSR DERMBND ADV

## (undated) DEVICE — [STANDARD 12-FLUTE BARREL BUR, ARTHROSCOPIC SHAVER BLADE,  DO NOT RESTERILIZE,  DO NOT USE IF PACKAGE IS DAMAGED,  KEEP DRY,  KEEP AWAY FROM SUNLIGHT]: Brand: FORMULA

## (undated) DEVICE — LACTATED. R IRRIG